# Patient Record
Sex: MALE | Race: WHITE | NOT HISPANIC OR LATINO | URBAN - METROPOLITAN AREA
[De-identification: names, ages, dates, MRNs, and addresses within clinical notes are randomized per-mention and may not be internally consistent; named-entity substitution may affect disease eponyms.]

---

## 2019-08-13 ENCOUNTER — OUTPATIENT (OUTPATIENT)
Dept: OUTPATIENT SERVICES | Facility: HOSPITAL | Age: 29
LOS: 1 days | Discharge: HOME | End: 2019-08-13

## 2019-08-13 DIAGNOSIS — R19.7 DIARRHEA, UNSPECIFIED: ICD-10-CM

## 2019-09-06 ENCOUNTER — INPATIENT (INPATIENT)
Facility: HOSPITAL | Age: 29
LOS: 3 days | Discharge: HOME | End: 2019-09-10
Attending: PSYCHIATRY & NEUROLOGY | Admitting: PSYCHIATRY & NEUROLOGY
Payer: MEDICAID

## 2019-09-06 VITALS — WEIGHT: 255.07 LBS

## 2019-09-06 DIAGNOSIS — F39 UNSPECIFIED MOOD [AFFECTIVE] DISORDER: ICD-10-CM

## 2019-09-06 LAB
APAP SERPL-MCNC: <5 UG/ML — LOW (ref 10–30)
BASOPHILS # BLD AUTO: 0.08 K/UL — SIGNIFICANT CHANGE UP (ref 0–0.2)
BASOPHILS NFR BLD AUTO: 0.8 % — SIGNIFICANT CHANGE UP (ref 0–1)
EOSINOPHIL # BLD AUTO: 0.17 K/UL — SIGNIFICANT CHANGE UP (ref 0–0.7)
EOSINOPHIL NFR BLD AUTO: 1.6 % — SIGNIFICANT CHANGE UP (ref 0–8)
ETHANOL SERPL-MCNC: <10 MG/DL — SIGNIFICANT CHANGE UP
HCT VFR BLD CALC: 47.5 % — SIGNIFICANT CHANGE UP (ref 42–52)
HGB BLD-MCNC: 16.5 G/DL — SIGNIFICANT CHANGE UP (ref 14–18)
IMM GRANULOCYTES NFR BLD AUTO: 0.6 % — HIGH (ref 0.1–0.3)
LYMPHOCYTES # BLD AUTO: 2.86 K/UL — SIGNIFICANT CHANGE UP (ref 1.2–3.4)
LYMPHOCYTES # BLD AUTO: 27.5 % — SIGNIFICANT CHANGE UP (ref 20.5–51.1)
MCHC RBC-ENTMCNC: 30 PG — SIGNIFICANT CHANGE UP (ref 27–31)
MCHC RBC-ENTMCNC: 34.7 G/DL — SIGNIFICANT CHANGE UP (ref 32–37)
MCV RBC AUTO: 86.4 FL — SIGNIFICANT CHANGE UP (ref 80–94)
MONOCYTES # BLD AUTO: 0.87 K/UL — HIGH (ref 0.1–0.6)
MONOCYTES NFR BLD AUTO: 8.4 % — SIGNIFICANT CHANGE UP (ref 1.7–9.3)
NEUTROPHILS # BLD AUTO: 6.35 K/UL — SIGNIFICANT CHANGE UP (ref 1.4–6.5)
NEUTROPHILS NFR BLD AUTO: 61.1 % — SIGNIFICANT CHANGE UP (ref 42.2–75.2)
NRBC # BLD: 0 /100 WBCS — SIGNIFICANT CHANGE UP (ref 0–0)
PLATELET # BLD AUTO: 183 K/UL — SIGNIFICANT CHANGE UP (ref 130–400)
RBC # BLD: 5.5 M/UL — SIGNIFICANT CHANGE UP (ref 4.7–6.1)
RBC # FLD: 12.5 % — SIGNIFICANT CHANGE UP (ref 11.5–14.5)
SALICYLATES SERPL-MCNC: <0.3 MG/DL — LOW (ref 4–30)
WBC # BLD: 10.39 K/UL — SIGNIFICANT CHANGE UP (ref 4.8–10.8)
WBC # FLD AUTO: 10.39 K/UL — SIGNIFICANT CHANGE UP (ref 4.8–10.8)

## 2019-09-06 PROCEDURE — 99285 EMERGENCY DEPT VISIT HI MDM: CPT

## 2019-09-06 RX ORDER — HYDROXYZINE HCL 10 MG
50 TABLET ORAL EVERY 6 HOURS
Refills: 0 | Status: DISCONTINUED | OUTPATIENT
Start: 2019-09-06 | End: 2019-09-10

## 2019-09-06 RX ORDER — RISPERIDONE 4 MG/1
0.5 TABLET ORAL
Refills: 0 | Status: DISCONTINUED | OUTPATIENT
Start: 2019-09-06 | End: 2019-09-10

## 2019-09-06 RX ORDER — ACETAMINOPHEN 500 MG
975 TABLET ORAL ONCE
Refills: 0 | Status: COMPLETED | OUTPATIENT
Start: 2019-09-06 | End: 2019-09-06

## 2019-09-06 RX ORDER — NICOTINE POLACRILEX 2 MG
1 GUM BUCCAL DAILY
Refills: 0 | Status: DISCONTINUED | OUTPATIENT
Start: 2019-09-06 | End: 2019-09-10

## 2019-09-06 RX ADMIN — Medication 975 MILLIGRAM(S): at 20:21

## 2019-09-06 RX ADMIN — RISPERIDONE 0.5 MILLIGRAM(S): 4 TABLET ORAL at 22:45

## 2019-09-06 RX ADMIN — Medication 975 MILLIGRAM(S): at 22:46

## 2019-09-06 RX ADMIN — Medication 1 PATCH: at 19:31

## 2019-09-06 NOTE — H&P ADULT - NSHPLABSRESULTS_GEN_ALL_CORE
16.5   10.39 )-----------( 183      ( 06 Sep 2019 19:10 )             47.5       09-06    142  |  106  |  12  ----------------------------<  100<H>  3.8   |  22  |  0.8    Ca    9.3      06 Sep 2019 19:10    TPro  7.2  /  Alb  4.7  /  TBili  <0.2  /  DBili  x   /  AST  27  /  ALT  43<H>  /  AlkPhos  66  09-06                      Lactate Trend            CAPILLARY BLOOD GLUCOSE

## 2019-09-06 NOTE — ED BEHAVIORAL HEALTH ASSESSMENT NOTE - RISK ASSESSMENT
Pt is at elevated risk due to labile mood, impulsivity, aggressive behavior and non engagement in treatment. Risk is mitigated by pt denying SI/HI, supportive family, and willingness to seek treatment.

## 2019-09-06 NOTE — H&P ADULT - HISTORY OF PRESENT ILLNESS
27 yo male, no pmh, presents to ed for psych evaluation . pt states is not on any medications, had questionable dx of bipolar as child, now sent in from outpt psych for admission/tx for aggression at home. At this time pt states he does not feel angry or agitated. denies fever, chills, cp, sob, si/hi, abd pain, nvd,.

## 2019-09-06 NOTE — ED BEHAVIORAL HEALTH ASSESSMENT NOTE - PAST PSYCHOTROPIC MEDICATION
Xanax 0.5mg q24 and Adderall 10mg q24 last 1 week ago  pt and wife do not recall names of other psychotropics
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
## 2019-09-06 NOTE — ED PROVIDER NOTE - PHYSICAL EXAMINATION
Physical Exam    Vital Signs: I have reviewed the initial vital signs.  Constitutional: well-nourished, appears stated age, no acute distress  Eyes: Conjunctiva pink, Sclera clear, PERRLA, EOMI.    Cardiovascular: S1 and S2, regular rate, regular rhythm, well-perfused extremities, radial pulses equal and 2+  Respiratory: unlabored respiratory effort, clear to auscultation bilaterally no wheezing, rales and rhonchi  Gastrointestinal: soft, non-tender abdomen, no pulsatile mass, normal bowl sounds  Musculoskeletal: supple neck, no lower extremity edema, no midline tenderness  Integumentary: warm, dry, no rash  Neurologic: awake, alert, cranial nerves II-XII grossly intact, extremities’ motor and sensory functions grossly intact  Psychiatric: appropriate mood, appropriate affect

## 2019-09-06 NOTE — ED PROVIDER NOTE - OBJECTIVE STATEMENT
27 yo male, no pmh, presents to ed for psych. pt states is not on any medications, had questionable dx of bipolar as child, 29 yo male, no pmh, presents to ed for psych. pt states is not on any medications, had questionable dx of bipolar as child, now sent in from outpt psych for admission/tx for aggression at home. At this time pt states he does not feel angry or agitated. denies fever, chills, cp, sob, si/hi, abd pain, nvd,.

## 2019-09-06 NOTE — ED BEHAVIORAL HEALTH ASSESSMENT NOTE - OTHER PAST PSYCHIATRIC HISTORY (INCLUDE DETAILS REGARDING ONSET, COURSE OF ILLNESS, INPATIENT/OUTPATIENT TREATMENT)
Pt reports that he was diagnosed with Bipolar disorder, which he and his wife think is questionable, and ADHD at age 12, but was poorly adherent with meds and followup. No psych followup or meds for past year. Pt reports that he was getting Xanax 0.5mg and Adderall 10mg from his PMD (last taken 1 week ago), but "they did not help".   Started seeing therapist Jose Maria Hannah 4 weeks ago who told pt he may have Borderline personality disorder    [facility] - lorazepam 1mg x 30 rx 6/20 by Dr Quinn

## 2019-09-06 NOTE — ED PROVIDER NOTE - ATTENDING CONTRIBUTION TO CARE
27 yo M presents with c/o needing to speak to psychiatrist.  Pt was evaluated as outpatient and they suggested that he should come to ED for possible admission.  Pt has been aggressive at home,  not suicidal, no AV hallucinations,.  On exam pt in NAD AAO x 3, steady gait, speech is clear, no sign sof trauma, Lungs cta B/L no wrr

## 2019-09-06 NOTE — ED PROVIDER NOTE - NS ED ROS FT
Constitutional: (-) fever, (-) chills  Eyes: (-) visual changes  ENT:  (-) nasal congestions  Cardiovascular: (-) chest pain, (-) syncope  Respiratory: (-) cough, (-) shortness of breath  Gastrointestinal: (-) vomiting, (-) diarrhea, (-)nausea,  Musculoskeletal: (-) neck pain, (-) back pain, (-) joint pain,  Integumentary: (-) rash,   Neurological: (-) headache, , (-) dizziness, (-) tingling, (-)numbness  Psychiatric: (-) hallucinations, (-)nervousness, (-)depression, (-)SI/HI  Peripheral Vascular: (-) pain while walking, (-) leg swelling  :  (-)dysuria  Allergic/Immunologic: (-) pruritus

## 2019-09-06 NOTE — H&P ADULT - NSHPPHYSICALEXAM_GEN_ALL_CORE
Vital Signs Last 24 Hrs  T(C): 36 (09-06-19 @ 21:30)  T(F): 96.8 (09-06-19 @ 21:30), Max: 97.8 (09-06-19 @ 17:32)  HR: 83 (09-06-19 @ 21:30) (83 - 105)  BP: 152/87 (09-06-19 @ 20:23)  BP(mean): --  RR: 16 (09-06-19 @ 21:30) (16 - 18)  SpO2: 97% (09-06-19 @ 20:23) (97% - 97%)  Wt(kg): --
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
## 2019-09-06 NOTE — ED BEHAVIORAL HEALTH ASSESSMENT NOTE - SUICIDE PROTECTIVE FACTORS
Responsibility to family and others/Identifies reasons for living/Positive therapeutic relationships/Future oriented/Supportive social network or family/Engaged in work or school

## 2019-09-06 NOTE — ED BEHAVIORAL HEALTH ASSESSMENT NOTE - DESCRIPTION
Initially cooperative with interview, but became irritable over the course. No verbal or physical aggression in ED none , domiciled with wife, has 2 yo daughter, employed

## 2019-09-07 LAB
ESTIMATED AVERAGE GLUCOSE: 120 MG/DL — HIGH (ref 68–114)
HAV IGM SER-ACNC: SIGNIFICANT CHANGE UP
HBA1C BLD-MCNC: 5.8 % — HIGH (ref 4–5.6)
HBV CORE IGM SER-ACNC: SIGNIFICANT CHANGE UP
HBV SURFACE AG SER-ACNC: SIGNIFICANT CHANGE UP
HCV AB S/CO SERPL IA: 0.15 S/CO — SIGNIFICANT CHANGE UP (ref 0–0.99)
HCV AB SERPL-IMP: SIGNIFICANT CHANGE UP
T PALLIDUM AB TITR SER: NEGATIVE — SIGNIFICANT CHANGE UP

## 2019-09-07 PROCEDURE — 99231 SBSQ HOSP IP/OBS SF/LOW 25: CPT

## 2019-09-07 RX ADMIN — Medication 1 PATCH: at 08:13

## 2019-09-07 RX ADMIN — RISPERIDONE 0.5 MILLIGRAM(S): 4 TABLET ORAL at 20:39

## 2019-09-07 RX ADMIN — Medication 1 PATCH: at 07:00

## 2019-09-07 RX ADMIN — Medication 1 PATCH: at 20:42

## 2019-09-07 RX ADMIN — RISPERIDONE 0.5 MILLIGRAM(S): 4 TABLET ORAL at 08:14

## 2019-09-07 RX ADMIN — Medication 50 MILLIGRAM(S): at 20:40
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
## 2019-09-08 PROCEDURE — 99231 SBSQ HOSP IP/OBS SF/LOW 25: CPT

## 2019-09-08 RX ORDER — ACETAMINOPHEN 500 MG
650 TABLET ORAL EVERY 6 HOURS
Refills: 0 | Status: DISCONTINUED | OUTPATIENT
Start: 2019-09-08 | End: 2019-09-10

## 2019-09-08 RX ORDER — ACETAMINOPHEN 500 MG
650 TABLET ORAL EVERY 6 HOURS
Refills: 0 | Status: DISCONTINUED | OUTPATIENT
Start: 2019-09-08 | End: 2019-09-08

## 2019-09-08 RX ADMIN — RISPERIDONE 0.5 MILLIGRAM(S): 4 TABLET ORAL at 20:01

## 2019-09-08 RX ADMIN — Medication 50 MILLIGRAM(S): at 20:01

## 2019-09-08 RX ADMIN — Medication 1 PATCH: at 20:02

## 2019-09-08 RX ADMIN — Medication 1 PATCH: at 08:03

## 2019-09-08 RX ADMIN — RISPERIDONE 0.5 MILLIGRAM(S): 4 TABLET ORAL at 08:04

## 2019-09-08 NOTE — PROGRESS NOTE BEHAVIORAL HEALTH - NSBHCHARTREVIEWVS_PSY_A_CORE FT
ICU Vital Signs Last 24 Hrs  T(C): 35.9 (08 Sep 2019 06:17), Max: 36.8 (07 Sep 2019 10:24)  T(F): 96.7 (08 Sep 2019 06:17), Max: 98.2 (07 Sep 2019 10:24)  HR: 68 (08 Sep 2019 06:17) (68 - 86)  BP: 144/83 (08 Sep 2019 06:17) (112/66 - 144/83)  BP(mean): --  ABP: --  ABP(mean): --  RR: 18 (08 Sep 2019 06:17) (16 - 18)  SpO2: --

## 2019-09-09 DIAGNOSIS — F29 UNSPECIFIED PSYCHOSIS NOT DUE TO A SUBSTANCE OR KNOWN PHYSIOLOGICAL CONDITION: ICD-10-CM

## 2019-09-09 LAB
AMPHET UR-MCNC: NEGATIVE — SIGNIFICANT CHANGE UP
APPEARANCE UR: CLEAR — SIGNIFICANT CHANGE UP
BARBITURATES UR SCN-MCNC: NEGATIVE — SIGNIFICANT CHANGE UP
BENZODIAZ UR-MCNC: NEGATIVE — SIGNIFICANT CHANGE UP
BILIRUB UR-MCNC: NEGATIVE — SIGNIFICANT CHANGE UP
COCAINE METAB.OTHER UR-MCNC: NEGATIVE — SIGNIFICANT CHANGE UP
COLOR SPEC: YELLOW — SIGNIFICANT CHANGE UP
DIFF PNL FLD: NEGATIVE — SIGNIFICANT CHANGE UP
GLUCOSE UR QL: NEGATIVE MG/DL — SIGNIFICANT CHANGE UP
KETONES UR-MCNC: NEGATIVE — SIGNIFICANT CHANGE UP
LEUKOCYTE ESTERASE UR-ACNC: NEGATIVE — SIGNIFICANT CHANGE UP
METHADONE UR-MCNC: NEGATIVE — SIGNIFICANT CHANGE UP
NITRITE UR-MCNC: NEGATIVE — SIGNIFICANT CHANGE UP
OPIATES UR-MCNC: NEGATIVE — SIGNIFICANT CHANGE UP
PCP SPEC-MCNC: SIGNIFICANT CHANGE UP
PH UR: 8.5 — SIGNIFICANT CHANGE UP (ref 5–8)
PROPOXYPHENE QUALITATIVE URINE RESULT: NEGATIVE — SIGNIFICANT CHANGE UP
PROT UR-MCNC: NEGATIVE MG/DL — SIGNIFICANT CHANGE UP
SP GR SPEC: 1.02 — SIGNIFICANT CHANGE UP (ref 1.01–1.03)
UROBILINOGEN FLD QL: 0.2 MG/DL — SIGNIFICANT CHANGE UP (ref 0.2–0.2)

## 2019-09-09 RX ADMIN — RISPERIDONE 0.5 MILLIGRAM(S): 4 TABLET ORAL at 08:12

## 2019-09-09 RX ADMIN — Medication 1 PATCH: at 18:19

## 2019-09-09 RX ADMIN — Medication 50 MILLIGRAM(S): at 20:21

## 2019-09-09 RX ADMIN — RISPERIDONE 0.5 MILLIGRAM(S): 4 TABLET ORAL at 20:20

## 2019-09-09 NOTE — PROGRESS NOTE BEHAVIORAL HEALTH - NSBHCHARTREVIEWLAB_PSY_A_CORE FT
06 Sep 2019 19:10  Sodium 142 [135 - 146]  Chloride 106 [98 - 110]  BUN 12 [10 - 20]  Glucose 100<H> [70 - 99]  Potassium 3.8 [3.5 - 5.0]  Anion Gap 14 [7 - 14]  Carbon dioxide 22 [17 - 32]  Creatinine 0.8 [0.7 - 1.5]      Ca    9.3 [8.5 - 10.1]      06 Sep 2019 19:10        06 Sep 2019 19:10  TPro 7.2 [6.0 - 8.0]  Alb  4.7 [3.5 - 5.2]   TBili <0.2 [0.2 - 1.2]   DBili x       AST  27 [0 - 41]  ALT  43<H> [0 - 41]   AlkPhos 66 [30 - 115]

## 2019-09-09 NOTE — PROGRESS NOTE BEHAVIORAL HEALTH - NSBHCHARTREVIEWVS_PSY_A_CORE FT
T(C): 36.2 (09-09-19 @ 10:00), Max: 36.2 (09-09-19 @ 10:00)  HR: 83 (09-09-19 @ 10:00) (64 - 83)  BP: 159/97 (09-09-19 @ 10:00) (136/87 - 159/97)  RR: 20 (09-09-19 @ 10:00) (18 - 20)  SpO2: --

## 2019-09-09 NOTE — PROGRESS NOTE BEHAVIORAL HEALTH - NSBHFUPINTERVALHXFT_PSY_A_CORE
Chart reviewed. discuss with nursing. spoke with patient. this is a 29 yo  male, , domiciled, employed, with reported psych hx of Bipolar disorder and ADHD, no past SA/IPP, no current meds, sent to ED by therapist for progressively worsening mood and aggressive behavior.   pt has been in behavioral control and has not needed any prn as per nursing, He slept ok. He denies s/h ideations/ his mood is anxious and his affect is mood congruent at this time. he is compliant with current medications.
Pt was seen, and evaluated, and chart was reviewed. No acute events overnight.  Patient is alert, Ox3,  calm, cooperative, compliant with treatment.     ***Pt denies and presents no evidence for suicidal or homicidal ideas plans or intentions.  Pt is not acutely psychotic and denies A/V H.  ***Pt slept well, and reports normal appetite and regular bowel movements. Pt is tolerating meds well w/o any acute S/E.  Pt has no medication related complaints. Continues current medication regimen. With patient's consent writer spoke with pt's wife Simin  by phone ([phone] )   and obtained additional background information.  Pt's wife reports that she has visited her  and feels he is much better and she is willing for him to come home . She reports that pt had AH over th past few months hearing his baby cry when he was not or or hearing his wife talk when she was not .  Wife aslfaizan reports that over the past few months pt was at times "irritable., irrate, yeling cursing, threatening, and was p[hysically aggressive". [facility] pt had an rx of Ativan 1mg po #30, but no xanx or adderall. Pt is in good behavioral control. Pt's discharge is  projected  for  tomorrow with psychiatric f/u tx at  [facility].
this is a 29 yo  male, , domiciled, employed, with reported psych hx of Bipolar disorder and ADHD, no past SA/IPP, no current meds, sent to ED by therapist for progressively worsening mood and aggressive behavior.   Pt was seen, evaluated, chart reviewed.  As per nursing staff, no events overnight. No behavioral outbursts. No PRNs given. On evaluation, pt reports that he is doing "ok."   Endorsed fair sleep and appetite. States that he was "started on some medication" and denied side effects. Pt denied A/V hallucinations. Denied suicidal/homicidal ideation, intent or plan.

## 2019-09-09 NOTE — CHART NOTE - NSCHARTNOTEFT_GEN_A_CORE
Patient is a 29 year old male  domiciled and employed with a history of mood disorder admitted to unit 939 emergency status for treatment safety and observation. Patient has been irritable as of late, arguing with his wife acting impulsively and with potential for unpredictable behavior. Patient reports being in treatment for bipolar disorder in the past but never felt much better. 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 Patient denies any illicit drug use. Patient denies excessive alcohol use. Patient  denies SI HI and AVH. Patient amenable to treatment. Patient admitted to unit for treatment safety and observation. Please see social work psychosocial assessment for more information.

## 2019-09-09 NOTE — CONSULT NOTE ADULT - SUBJECTIVE AND OBJECTIVE BOX
BEBETO MEYERS  29y  Male      Patient is a 29y old  Male who presents with a chief complaint of 28 YEARS OLD MALE COME TO ER FOR PSYCHIATRY EVALUATION . (06 Sep 2019 21:27)    HPI:  27 yo male, no pmh, presents to ed for psych evaluation . pt states is not on any medications, had questionable dx of bipolar as child, now sent in from outpt psych for admission/tx for aggression at home. At this time pt states he does not feel angry or agitated. denies fever, chills, cp, sob, si/hi, abd pain, nvd,. (06 Sep 2019 21:27)    INTERVAL HPI/OVERNIGHT EVENTS:  HEALTH ISSUES - PROBLEM Dx:  Mood disorder        PAST MEDICAL & SURGICAL HISTORY:  No pertinent past medical history  No significant past surgical history    FAMILY HISTORY:    acetaminophen   Tablet .. 650 milliGRAM(s) Oral every 6 hours PRN  hydrOXYzine hydrochloride 50 milliGRAM(s) Oral every 6 hours PRN  nicotine - 21 mG/24Hr(s) Patch 1 Patch Transdermal daily  risperiDONE   Tablet 0.5 milliGRAM(s) Oral two times a day      REVIEW OF SYSTEMS:  CONSTITUTIONAL: No fever, weight loss, or fatigue  EYES: No eye pain, visual disturbances, or discharge  ENMT:  No difficulty hearing, tinnitus, vertigo; No sinus or throat pain  NECK: No pain or stiffness  BREASTS: No pain, masses, or nipple discharge  RESPIRATORY: No cough, wheezing, chills or hemoptysis; No shortness of breath  CARDIOVASCULAR: No chest pain, palpitations, dizziness, or leg swelling  GASTROINTESTINAL: No abdominal or epigastric pain. No nausea, vomiting, or hematemesis; No diarrhea or constipation. No melena or hematochezia.  GENITOURINARY: No dysuria, frequency, hematuria, or incontinence  NEUROLOGICAL: No headaches, memory loss, loss of strength, numbness, or tremors  SKIN: No itching, burning, rashes, or lesions   LYMPH NODES: No enlarged glands  ENDOCRINE: No heat or cold intolerance; No hair loss  MUSCULOSKELETAL: No joint pain or swelling; No muscle, back, or extremity pain  PSYCHIATRIC: as per hpi and previous psych history  HEME/LYMPH: No easy bruising, or bleeding gums  ALLERY AND IMMUNOLOGIC: No hives or eczema    T(C): 36 (09-09-19 @ 06:12), Max: 36.2 (09-08-19 @ 09:27)  HR: 64 (09-09-19 @ 06:12) (64 - 88)  BP: 136/87 (09-09-19 @ 06:12) (136/87 - 145/75)  RR: 18 (09-09-19 @ 06:12) (18 - 18)  SpO2: --  Wt(kg): --Vital Signs Last 24 Hrs  T(C): 36 (09 Sep 2019 06:12), Max: 36.2 (08 Sep 2019 09:27)  T(F): 96.8 (09 Sep 2019 06:12), Max: 97.1 (08 Sep 2019 09:27)  HR: 64 (09 Sep 2019 06:12) (64 - 88)  BP: 136/87 (09 Sep 2019 06:12) (136/87 - 145/75)  BP(mean): --  RR: 18 (09 Sep 2019 06:12) (18 - 18)  SpO2: --    PHYSICAL EXAM:  GENERAL: NAD,well-developed  HEAD:  Atraumatic, Normocephalic  EYES: EOMI, PERRLA, conjunctiva and sclera clear  ENMT: No tonsillar erythema, exudates, or enlargement; Moist mucous membranes, Good dentition, No lesions  NECK: Supple, No JVD, Normal thyroid  CHEST/LUNG: Clear bs bilaterally; No rales, rhonchi, wheezing  HEART: Regular rate and rhythm; No murmurs, rubs, or gallops  ABDOMEN: Soft, Nontender, Nondistended; Bowel sounds present  EXTREMITIES:  2+ Peripheral Pulses, No clubbing, cyanosis, or edema  LYMPH: No lymphadenopathy noted  SKIN: No rashes or lesions  Neuro: alert  no focal deficits    Consultant(s) Notes Reviewed:  [x ] YES  [ ] NO  Care Discussed with Consultants/Other Providers [ x] YES  [ ] NO    LABS:              CAPILLARY BLOOD GLUCOSE                RADIOLOGY & ADDITIONAL TESTS:    Imaging Personally Reviewed:  [ ] YES  [ ] NO

## 2019-09-09 NOTE — PROGRESS NOTE BEHAVIORAL HEALTH - NSBHADMITIPREASON_PSY_A_CORE
aggressive behavior/Danger to others; mental illness expected to respond to inpatient care

## 2019-09-10 VITALS
DIASTOLIC BLOOD PRESSURE: 96 MMHG | TEMPERATURE: 97 F | HEART RATE: 95 BPM | RESPIRATION RATE: 20 BRPM | SYSTOLIC BLOOD PRESSURE: 149 MMHG

## 2019-09-10 DIAGNOSIS — I10 ESSENTIAL (PRIMARY) HYPERTENSION: ICD-10-CM

## 2019-09-10 RX ORDER — RISPERIDONE 4 MG/1
1 TABLET ORAL
Qty: 60 | Refills: 0
Start: 2019-09-10 | End: 2019-10-09

## 2019-09-10 RX ORDER — HYDROXYZINE HCL 10 MG
1 TABLET ORAL
Qty: 40 | Refills: 0
Start: 2019-09-10 | End: 2019-10-09

## 2019-09-10 RX ORDER — ACETAMINOPHEN 500 MG
2 TABLET ORAL
Qty: 0 | Refills: 0 | DISCHARGE
Start: 2019-09-10

## 2019-09-10 RX ORDER — NICOTINE POLACRILEX 2 MG
1 GUM BUCCAL
Qty: 30 | Refills: 0
Start: 2019-09-10 | End: 2019-10-09

## 2019-09-10 RX ORDER — ACETAMINOPHEN 500 MG
650 TABLET ORAL EVERY 6 HOURS
Refills: 0 | Status: DISCONTINUED | OUTPATIENT
Start: 2019-09-10 | End: 2019-09-10

## 2019-09-10 RX ADMIN — Medication 650 MILLIGRAM(S): at 01:47

## 2019-09-10 RX ADMIN — Medication 1 PATCH: at 05:47

## 2019-09-10 RX ADMIN — Medication 650 MILLIGRAM(S): at 01:17

## 2019-09-10 RX ADMIN — RISPERIDONE 0.5 MILLIGRAM(S): 4 TABLET ORAL at 08:11

## 2019-09-10 RX ADMIN — Medication 1 PATCH: at 08:12

## 2019-09-10 NOTE — PROGRESS NOTE ADULT - PROBLEM SELECTOR PLAN 1
continue present treatment as per psych plan as reviewed  Medically stable with no new changes in treatment  will continue to monitor medical status while being treated on psych  medically stable for d/c  need outpt f/u
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
## 2019-09-10 NOTE — DISCHARGE NOTE BEHAVIORAL HEALTH - PAST PSYCHIATRIC HISTORY
Pt reports that he was diagnosed with Bipolar disorder, which he and his wife think is questionable, and ADHD at age 12, but was poorly adherent with meds and followup. No psych followup or meds for past year. Pt reports that he was getting Xanax 0.5mg and Adderall 10mg from his PMD (last taken 1 week ago), but "they did not help".   Started seeing therapist Jose Maria Hannah 4 weeks ago who told pt he may have Borderline personality disorder

## 2019-09-10 NOTE — DISCHARGE NOTE BEHAVIORAL HEALTH - NSBHDCMEDICALFT_PSY_A_CORE
pt is obese and lipids and FBG are slightly elevated- pt was advised to address these issues with his PCP

## 2019-09-10 NOTE — DISCHARGE NOTE BEHAVIORAL HEALTH - NSBHDCCONDITIONFT_PSY_A_CORE
Patient's condition improved. Upon discharge the patient is in good behavioral control, and presents no acute management problems. Patient denies and presents no evidence for suicidal or homicidal ideas plans or intentions. Patient is not acutely depressed, manic or psychotic. Patient has been sleeping  well, and reports normal appetite and regular bowel movements. Pt was not aggressive or hostile to anyone over th past two days. Pt and his wife feel comfortable with the discharge plan and the arrangements for f/u tx.

## 2019-09-10 NOTE — DISCHARGE NOTE BEHAVIORAL HEALTH - NSBHDCDXVALIDYESFT_PSY_A_CORE
mood d/o NOS; psychosis NOS; bipolar d/o; r/o Adderall misuse  Pt responded well to tx and his condition rapidly stabilized. Pt is advised to continue psychiatric follow up at the [facility] for further diagnostic  evaluation and treatment

## 2019-09-10 NOTE — PROGRESS NOTE ADULT - SUBJECTIVE AND OBJECTIVE BOX
pt stable alert in NAD  no new complaints    MOOD DISORDER  ^PSYCH EVAL  Handoff  MEWS Score  No pertinent past medical history  Mood disorder  Psychosis, unspecified psychosis type  Mood disorder  No significant past surgical history  PSYCH EVAL    HEALTH ISSUES - PROBLEM Dx:  Psychosis, unspecified psychosis type  Mood disorder        PAST MEDICAL & SURGICAL HISTORY:  No pertinent past medical history  No significant past surgical history    No Known Allergies      FAMILY HISTORY:      acetaminophen   Tablet .. 650 milliGRAM(s) Oral every 6 hours PRN  acetaminophen   Tablet .. 650 milliGRAM(s) Oral every 6 hours PRN  hydrOXYzine hydrochloride 50 milliGRAM(s) Oral every 6 hours PRN  nicotine - 21 mG/24Hr(s) Patch 1 Patch Transdermal daily  risperiDONE   Tablet 0.5 milliGRAM(s) Oral two times a day      T(C): 36.2 (09-10-19 @ 08:20), Max: 36.6 (09-09-19 @ 18:12)  HR: 95 (09-10-19 @ 08:20) (83 - 105)  BP: 149/96 (09-10-19 @ 08:20) (123/64 - 159/97)  RR: 20 (09-10-19 @ 08:20) (18 - 20)  SpO2: --    PE;  general:  no cahnges noted in nad    Lungs:    Heart:    EXT:    Neuro:  alert no defcits                          CAPILLARY BLOOD GLUCOSE

## 2019-09-10 NOTE — DISCHARGE NOTE BEHAVIORAL HEALTH - SECONDARY DIAGNOSIS.
Psychosis, unspecified psychosis type Metabolic syndrome Morbid obesity with BMI of 40.0-44.9, adult

## 2019-09-10 NOTE — DISCHARGE NOTE BEHAVIORAL HEALTH - NSTOBACCOREFERRAL_GEN_A_NCS
Patient  registered and referred to the [place] [facility]. Phone appointment scheduled for 9/12/19 at 0900./Yes

## 2019-09-10 NOTE — DISCHARGE NOTE BEHAVIORAL HEALTH - FAMILY HISTORY OF PSYCHIATRIC ILLNESS
28 yo  male,  (x 4yrs) , father of two ( 2y/o and 4 months old),  domiciled, HS graduate, employed as . No known legal  issues. .

## 2019-09-10 NOTE — DISCHARGE NOTE BEHAVIORAL HEALTH - MEDICATION SUMMARY - MEDICATIONS TO TAKE
I will START or STAY ON the medications listed below when I get home from the hospital:    risperiDONE 0.5 mg oral tablet  -- 1 tab(s) by mouth 2 times a day x 30 days   -- Indication: For MOOt instability/ psychosis    hydrOXYzine hydrochloride 50 mg oral tablet  -- 1 tab(s) by mouth every 6 hours x 30 days, As Needed -anxiety/ insomnia/ Agitation   -- Indication: For anxiety/ insomnia    nicotine 21 mg/24 hr transdermal film, extended release  -- 1 patch by transdermal patch once a day x 30 days   -- Indication: For NRT

## 2019-09-10 NOTE — DISCHARGE NOTE BEHAVIORAL HEALTH - CARE PROVIDER_API CALL
Himanshu Hernandez)  Psychiatry  [address] [place] [address]  Phone: [phone]  Fax: [phone]  Follow Up Time:

## 2019-09-10 NOTE — CHART NOTE - NSCHARTNOTEFT_GEN_A_CORE
Pt discharging today as per attending. Pt AOx3 denies SI Hi and AVH. Patient eager to discharge and expresses a commitment to the follow up plan. Please see below for detailed social work discharge plan.    SOCIAL WORK:    SOCIAL WORK DISCHARGE PLAN - Residence:  · Residence	home      Aftercare Appointments:  · Agency Name	[facility]  · Appointment Date/Time	17-Sep-2019 12:30  · Address	[address] [place] [address]  · Phone #	[phone]  · Purpose	out patient follow up with therapist Catalina  · Agency Name	[facility]  · Appointment Date/Time	19-Sep-2019 09:30  · Address	[address] [facility] [address]  · Phone #	[phone]  · Purpose	out patient follow up with Dr. Rico     Alcohol/Substance Abuse Treatment and Referrals:  · Alcohol/Substance Use Referrals	no   · Other Referrals	no      Crisis Plan:  1. If I Have The Following Problems:: Suicidal Thoughts  I Should:: Report to the nearest emergency room     Resources:  · Inpatient Psychiatric Unit - 24/7 phone #	[phone]  · Does the patient have a ?	no      Advance Directive:  · Does patient have advance directives (both medical and psychiatric), or a surrogate decision maker?	no...   · Reason	pt declines     24 Hours Prior to Discharge:  · Caregiver Identified	yes...   · Caregiver Name and Contact Information	wife Simin   · Caregiver notified of discharge/transfer	yes   · Patient education provided	yes   · Caregiver education provided	yes   · Comments	faxed to [facility] x [phone] 9-10-19 2pm

## 2019-09-12 DIAGNOSIS — E88.81 METABOLIC SYNDROME AND OTHER INSULIN RESISTANCE: ICD-10-CM

## 2019-09-12 DIAGNOSIS — Z28.21 IMMUNIZATION NOT CARRIED OUT BECAUSE OF PATIENT REFUSAL: ICD-10-CM

## 2019-09-12 DIAGNOSIS — I10 ESSENTIAL (PRIMARY) HYPERTENSION: ICD-10-CM

## 2019-09-12 DIAGNOSIS — F17.200 NICOTINE DEPENDENCE, UNSPECIFIED, UNCOMPLICATED: ICD-10-CM

## 2019-09-12 DIAGNOSIS — F29 UNSPECIFIED PSYCHOSIS NOT DUE TO A SUBSTANCE OR KNOWN PHYSIOLOGICAL CONDITION: ICD-10-CM

## 2019-09-12 DIAGNOSIS — F31.9 BIPOLAR DISORDER, UNSPECIFIED: ICD-10-CM

## 2019-09-12 DIAGNOSIS — E66.01 MORBID (SEVERE) OBESITY DUE TO EXCESS CALORIES: ICD-10-CM

## 2019-09-12 DIAGNOSIS — F39 UNSPECIFIED MOOD [AFFECTIVE] DISORDER: ICD-10-CM

## 2019-09-17 ENCOUNTER — OUTPATIENT (OUTPATIENT)
Dept: OUTPATIENT SERVICES | Facility: HOSPITAL | Age: 29
LOS: 1 days | Discharge: HOME | End: 2019-09-17

## 2019-09-17 PROBLEM — Z78.9 OTHER SPECIFIED HEALTH STATUS: Chronic | Status: ACTIVE | Noted: 2019-09-06

## 2019-09-18 DIAGNOSIS — F41.9 ANXIETY DISORDER, UNSPECIFIED: ICD-10-CM

## 2019-09-19 ENCOUNTER — OUTPATIENT (OUTPATIENT)
Dept: OUTPATIENT SERVICES | Facility: HOSPITAL | Age: 29
LOS: 1 days | Discharge: HOME | End: 2019-09-19
Payer: MEDICAID

## 2019-09-19 DIAGNOSIS — F41.9 ANXIETY DISORDER, UNSPECIFIED: ICD-10-CM

## 2019-09-19 PROCEDURE — 99214 OFFICE O/P EST MOD 30 MIN: CPT | Mod: GC

## 2019-10-01 ENCOUNTER — OUTPATIENT (OUTPATIENT)
Dept: OUTPATIENT SERVICES | Facility: HOSPITAL | Age: 29
LOS: 1 days | Discharge: HOME | End: 2019-10-01

## 2019-10-01 DIAGNOSIS — F41.9 ANXIETY DISORDER, UNSPECIFIED: ICD-10-CM

## 2019-10-07 ENCOUNTER — OUTPATIENT (OUTPATIENT)
Dept: OUTPATIENT SERVICES | Facility: HOSPITAL | Age: 29
LOS: 1 days | Discharge: HOME | End: 2019-10-07

## 2019-10-07 DIAGNOSIS — F41.9 ANXIETY DISORDER, UNSPECIFIED: ICD-10-CM

## 2019-10-15 ENCOUNTER — OUTPATIENT (OUTPATIENT)
Dept: OUTPATIENT SERVICES | Facility: HOSPITAL | Age: 29
LOS: 1 days | Discharge: HOME | End: 2019-10-15
Payer: MEDICAID

## 2019-10-15 DIAGNOSIS — F41.9 ANXIETY DISORDER, UNSPECIFIED: ICD-10-CM

## 2019-10-15 PROCEDURE — 99213 OFFICE O/P EST LOW 20 MIN: CPT | Mod: GC

## 2019-10-21 ENCOUNTER — OUTPATIENT (OUTPATIENT)
Dept: OUTPATIENT SERVICES | Facility: HOSPITAL | Age: 29
LOS: 1 days | Discharge: HOME | End: 2019-10-21

## 2019-10-21 DIAGNOSIS — F41.9 ANXIETY DISORDER, UNSPECIFIED: ICD-10-CM

## 2019-11-11 ENCOUNTER — OUTPATIENT (OUTPATIENT)
Dept: OUTPATIENT SERVICES | Facility: HOSPITAL | Age: 29
LOS: 1 days | Discharge: HOME | End: 2019-11-11
Payer: MEDICAID

## 2019-11-11 DIAGNOSIS — F63.81 INTERMITTENT EXPLOSIVE DISORDER: ICD-10-CM

## 2019-11-11 PROCEDURE — 99214 OFFICE O/P EST MOD 30 MIN: CPT | Mod: GC

## 2019-12-10 ENCOUNTER — OUTPATIENT (OUTPATIENT)
Dept: OUTPATIENT SERVICES | Facility: HOSPITAL | Age: 29
LOS: 1 days | Discharge: HOME | End: 2019-12-10
Payer: MEDICAID

## 2019-12-10 DIAGNOSIS — M54.5 LOW BACK PAIN: ICD-10-CM

## 2019-12-10 PROCEDURE — 72110 X-RAY EXAM L-2 SPINE 4/>VWS: CPT | Mod: 26

## 2020-07-20 ENCOUNTER — INPATIENT (INPATIENT)
Facility: HOSPITAL | Age: 30
LOS: 0 days | Discharge: HOME | End: 2020-07-21
Attending: SURGERY | Admitting: SURGERY
Payer: MEDICAID

## 2020-07-20 ENCOUNTER — RESULT REVIEW (OUTPATIENT)
Age: 30
End: 2020-07-20

## 2020-07-20 VITALS
WEIGHT: 265 LBS | HEART RATE: 94 BPM | RESPIRATION RATE: 18 BRPM | DIASTOLIC BLOOD PRESSURE: 88 MMHG | SYSTOLIC BLOOD PRESSURE: 164 MMHG | TEMPERATURE: 99 F | OXYGEN SATURATION: 95 %

## 2020-07-20 LAB
ALBUMIN SERPL ELPH-MCNC: 4.4 G/DL — SIGNIFICANT CHANGE UP (ref 3.5–5.2)
ALP SERPL-CCNC: 66 U/L — SIGNIFICANT CHANGE UP (ref 30–115)
ALT FLD-CCNC: 26 U/L — SIGNIFICANT CHANGE UP (ref 0–41)
ANION GAP SERPL CALC-SCNC: 12 MMOL/L — SIGNIFICANT CHANGE UP (ref 7–14)
APPEARANCE UR: CLEAR — SIGNIFICANT CHANGE UP
AST SERPL-CCNC: 18 U/L — SIGNIFICANT CHANGE UP (ref 0–41)
BACTERIA # UR AUTO: NEGATIVE — SIGNIFICANT CHANGE UP
BASOPHILS # BLD AUTO: 0.03 K/UL — SIGNIFICANT CHANGE UP (ref 0–0.2)
BASOPHILS NFR BLD AUTO: 0.3 % — SIGNIFICANT CHANGE UP (ref 0–1)
BILIRUB DIRECT SERPL-MCNC: <0.2 MG/DL — SIGNIFICANT CHANGE UP (ref 0–0.2)
BILIRUB INDIRECT FLD-MCNC: >0 MG/DL — LOW (ref 0.2–1.2)
BILIRUB SERPL-MCNC: 0.2 MG/DL — SIGNIFICANT CHANGE UP (ref 0.2–1.2)
BILIRUB UR-MCNC: NEGATIVE — SIGNIFICANT CHANGE UP
BLD GP AB SCN SERPL QL: SIGNIFICANT CHANGE UP
BUN SERPL-MCNC: 13 MG/DL — SIGNIFICANT CHANGE UP (ref 10–20)
CALCIUM SERPL-MCNC: 9 MG/DL — SIGNIFICANT CHANGE UP (ref 8.5–10.1)
CHLORIDE SERPL-SCNC: 105 MMOL/L — SIGNIFICANT CHANGE UP (ref 98–110)
CO2 SERPL-SCNC: 24 MMOL/L — SIGNIFICANT CHANGE UP (ref 17–32)
COLOR SPEC: YELLOW — SIGNIFICANT CHANGE UP
CREAT SERPL-MCNC: 0.9 MG/DL — SIGNIFICANT CHANGE UP (ref 0.7–1.5)
DIFF PNL FLD: NEGATIVE — SIGNIFICANT CHANGE UP
EOSINOPHIL # BLD AUTO: 0.16 K/UL — SIGNIFICANT CHANGE UP (ref 0–0.7)
EOSINOPHIL NFR BLD AUTO: 1.9 % — SIGNIFICANT CHANGE UP (ref 0–8)
EPI CELLS # UR: 2 /HPF — SIGNIFICANT CHANGE UP (ref 0–5)
GLUCOSE SERPL-MCNC: 174 MG/DL — HIGH (ref 70–99)
GLUCOSE UR QL: NEGATIVE — SIGNIFICANT CHANGE UP
HCT VFR BLD CALC: 46.2 % — SIGNIFICANT CHANGE UP (ref 42–52)
HGB BLD-MCNC: 15.9 G/DL — SIGNIFICANT CHANGE UP (ref 14–18)
HYALINE CASTS # UR AUTO: 15 /LPF — HIGH (ref 0–7)
IMM GRANULOCYTES NFR BLD AUTO: 0.3 % — SIGNIFICANT CHANGE UP (ref 0.1–0.3)
INR BLD: 0.99 RATIO — SIGNIFICANT CHANGE UP (ref 0.65–1.3)
KETONES UR-MCNC: SIGNIFICANT CHANGE UP
LEUKOCYTE ESTERASE UR-ACNC: ABNORMAL
LIDOCAIN IGE QN: 27 U/L — SIGNIFICANT CHANGE UP (ref 7–60)
LYMPHOCYTES # BLD AUTO: 1.94 K/UL — SIGNIFICANT CHANGE UP (ref 1.2–3.4)
LYMPHOCYTES # BLD AUTO: 22.5 % — SIGNIFICANT CHANGE UP (ref 20.5–51.1)
MCHC RBC-ENTMCNC: 30.2 PG — SIGNIFICANT CHANGE UP (ref 27–31)
MCHC RBC-ENTMCNC: 34.4 G/DL — SIGNIFICANT CHANGE UP (ref 32–37)
MCV RBC AUTO: 87.7 FL — SIGNIFICANT CHANGE UP (ref 80–94)
MONOCYTES # BLD AUTO: 0.52 K/UL — SIGNIFICANT CHANGE UP (ref 0.1–0.6)
MONOCYTES NFR BLD AUTO: 6 % — SIGNIFICANT CHANGE UP (ref 1.7–9.3)
NEUTROPHILS # BLD AUTO: 5.96 K/UL — SIGNIFICANT CHANGE UP (ref 1.4–6.5)
NEUTROPHILS NFR BLD AUTO: 69 % — SIGNIFICANT CHANGE UP (ref 42.2–75.2)
NITRITE UR-MCNC: NEGATIVE — SIGNIFICANT CHANGE UP
NRBC # BLD: 0 /100 WBCS — SIGNIFICANT CHANGE UP (ref 0–0)
PH UR: 6 — SIGNIFICANT CHANGE UP (ref 5–8)
PLATELET # BLD AUTO: 152 K/UL — SIGNIFICANT CHANGE UP (ref 130–400)
POTASSIUM SERPL-MCNC: 3.6 MMOL/L — SIGNIFICANT CHANGE UP (ref 3.5–5)
POTASSIUM SERPL-SCNC: 3.6 MMOL/L — SIGNIFICANT CHANGE UP (ref 3.5–5)
PROT SERPL-MCNC: 6.4 G/DL — SIGNIFICANT CHANGE UP (ref 6–8)
PROT UR-MCNC: SIGNIFICANT CHANGE UP
PROTHROM AB SERPL-ACNC: 11.4 SEC — SIGNIFICANT CHANGE UP (ref 9.95–12.87)
RBC # BLD: 5.27 M/UL — SIGNIFICANT CHANGE UP (ref 4.7–6.1)
RBC # FLD: 12.4 % — SIGNIFICANT CHANGE UP (ref 11.5–14.5)
RBC CASTS # UR COMP ASSIST: 2 /HPF — SIGNIFICANT CHANGE UP (ref 0–4)
SODIUM SERPL-SCNC: 141 MMOL/L — SIGNIFICANT CHANGE UP (ref 135–146)
SP GR SPEC: 1.04 — HIGH (ref 1.01–1.02)
UROBILINOGEN FLD QL: ABNORMAL
WBC # BLD: 8.64 K/UL — SIGNIFICANT CHANGE UP (ref 4.8–10.8)
WBC # FLD AUTO: 8.64 K/UL — SIGNIFICANT CHANGE UP (ref 4.8–10.8)
WBC UR QL: 37 /HPF — HIGH (ref 0–5)

## 2020-07-20 PROCEDURE — 99285 EMERGENCY DEPT VISIT HI MDM: CPT

## 2020-07-20 PROCEDURE — 74177 CT ABD & PELVIS W/CONTRAST: CPT | Mod: 26

## 2020-07-20 PROCEDURE — 71046 X-RAY EXAM CHEST 2 VIEWS: CPT | Mod: 26

## 2020-07-20 PROCEDURE — 99223 1ST HOSP IP/OBS HIGH 75: CPT | Mod: 57

## 2020-07-20 PROCEDURE — 93010 ELECTROCARDIOGRAM REPORT: CPT

## 2020-07-20 RX ORDER — SODIUM CHLORIDE 9 MG/ML
1000 INJECTION INTRAMUSCULAR; INTRAVENOUS; SUBCUTANEOUS
Refills: 0 | Status: DISCONTINUED | OUTPATIENT
Start: 2020-07-20 | End: 2020-07-21

## 2020-07-20 RX ORDER — CEFOTETAN DISODIUM 1 G
2 VIAL (EA) INJECTION ONCE
Refills: 0 | Status: COMPLETED | OUTPATIENT
Start: 2020-07-20 | End: 2020-07-20

## 2020-07-20 RX ORDER — OXYCODONE HYDROCHLORIDE 5 MG/1
5 TABLET ORAL EVERY 6 HOURS
Refills: 0 | Status: DISCONTINUED | OUTPATIENT
Start: 2020-07-20 | End: 2020-07-21

## 2020-07-20 RX ORDER — AMPICILLIN SODIUM AND SULBACTAM SODIUM 250; 125 MG/ML; MG/ML
3 INJECTION, POWDER, FOR SUSPENSION INTRAMUSCULAR; INTRAVENOUS EVERY 6 HOURS
Refills: 0 | Status: DISCONTINUED | OUTPATIENT
Start: 2020-07-20 | End: 2020-07-20

## 2020-07-20 RX ORDER — ENOXAPARIN SODIUM 100 MG/ML
40 INJECTION SUBCUTANEOUS DAILY
Refills: 0 | Status: DISCONTINUED | OUTPATIENT
Start: 2020-07-20 | End: 2020-07-21

## 2020-07-20 RX ORDER — ACETAMINOPHEN 500 MG
650 TABLET ORAL EVERY 6 HOURS
Refills: 0 | Status: DISCONTINUED | OUTPATIENT
Start: 2020-07-20 | End: 2020-07-21

## 2020-07-20 RX ORDER — AMPICILLIN SODIUM AND SULBACTAM SODIUM 250; 125 MG/ML; MG/ML
3 INJECTION, POWDER, FOR SUSPENSION INTRAMUSCULAR; INTRAVENOUS ONCE
Refills: 0 | Status: COMPLETED | OUTPATIENT
Start: 2020-07-20 | End: 2020-07-20

## 2020-07-20 RX ORDER — IBUPROFEN 200 MG
600 TABLET ORAL EVERY 6 HOURS
Refills: 0 | Status: DISCONTINUED | OUTPATIENT
Start: 2020-07-20 | End: 2020-07-21

## 2020-07-20 RX ORDER — PANTOPRAZOLE SODIUM 20 MG/1
40 TABLET, DELAYED RELEASE ORAL
Refills: 0 | Status: DISCONTINUED | OUTPATIENT
Start: 2020-07-20 | End: 2020-07-21

## 2020-07-20 RX ADMIN — AMPICILLIN SODIUM AND SULBACTAM SODIUM 200 GRAM(S): 250; 125 INJECTION, POWDER, FOR SUSPENSION INTRAMUSCULAR; INTRAVENOUS at 21:40

## 2020-07-20 RX ADMIN — Medication 100 GRAM(S): at 20:36

## 2020-07-20 RX ADMIN — SODIUM CHLORIDE 100 MILLILITER(S): 9 INJECTION INTRAMUSCULAR; INTRAVENOUS; SUBCUTANEOUS at 21:39

## 2020-07-20 NOTE — ED PROVIDER NOTE - NS ED ROS FT
CONST: No fever, chills or bodyaches  EYES: No pain, redness, drainage or visual changes.  ENT: No ear pain or discharge, nasal discharge or congestion. No sore throat  CARD: No chest pain, palpitations  RESP: No SOB, cough  GI: see HPI  : No dysuria, hematuria.   MS: No joint pain, back pain or extremity pain/injury  SKIN: No rashes  NEURO: No headache, dizziness, paresthesias or LOC

## 2020-07-20 NOTE — H&P ADULT - ASSESSMENT
29 years old male c/o Abdominal pain for the last 2 days. States he has a progressive sharp abdominal pain, started in lower abdomen, and then the pain was more localized in the RLQ, no radiation, worsens with movement, relieved by nothing. Associated with nausea and vomiting. Denies fever, chills, SOB, CP, back pain. PE patient with Mild distress d/t pain. Pain and tenderness on RLQ with guard. CT scan showed Dilated appendix, up to 0.9 cm with periappendiceal inflammatory change; no abscess, phlegmon, or evidence of perforation  Patient with symptoms and complementary studies that suggest Non-complicated Acute Appendicitis    Plan:  - Admission for General Surgery  - NPO  - IVF  - Pain Management  - Unasyn 3 g Q6h  - Laparoscopy appendectomy, possible open.     Case discussed with Dr. Bustillos and Dr. Joyner 29 years old male c/o Abdominal pain for the last 2 days. States he has a progressive sharp abdominal pain, started in lower abdomen, and then the pain was more localized in the RLQ, no radiation, worsens with movement, relieved by nothing. Associated with nausea and vomiting. Denies fever, chills, SOB, CP, back pain. PE patient with Mild distress d/t pain. Pain and tenderness on RLQ with guard. CT scan showed Dilated appendix, up to 0.9 cm with periappendiceal inflammatory change; no abscess, phlegmon, or evidence of perforation  Patient with symptoms and complementary studies that suggest Non-complicated Acute Appendicitis    Plan:  - Admission for General Surgery  - NPO  - IVF  - Pain Management  - Unasyn 3 g Q6h  - Laparoscopy appendectomy, possible open.     Case discussed with Dr. Polanco and Dr. Joyner      Senior Resident Note  Pt seen and examined  Above note has been reviewed and edited  29M with lower abdominal pain for 2 days with some nausea. WBC is 8.6 but he is tender in the RLQ and CT showed simple appendicitis with no perforation. Plan for OR for lap appy   Plan d/w patient and Dr. Ar polanco

## 2020-07-20 NOTE — ED PROVIDER NOTE - ATTENDING CONTRIBUTION TO CARE
28 y/o male with h/o bipolar d/o, denies PSH, now presents with RLQ pain x 1-2 days, sx are constant (waxing / waning), denies radiation, denies modifying factors, +nbnb vomiting x 1, passing stool and flatus, denies fever, diarrhea, anorexia, cough, respiratory sx, change in bowel habits or urinary sx, penile d/c or other associated complaints at present.     Old chart reviewed.  I have reviewed and agree with the nursing note, except as documented in my note.    VSS, awake, alert, non-toxic appearing, lying comfortably in stretcher, in NAD, no scleral icterus, oropharynx clear, mmm, no jaundice, skin rash or lesions, chest CTAB, non-labored breathing, no w/r/r, +S1/S2, RRR, no m/r/g, abdomen soft, RLQ ttp w/o peritoneal signs, ND, +BS, no hernias or distention, no pulsatile masses or bruits appreciated, no CVA tenderness, no peripheral edema or deformities, alert, clear speech and steady gait.

## 2020-07-20 NOTE — H&P ADULT - NSHPPHYSICALEXAM_GEN_ALL_CORE
PHYSICAL EXAM:  GENERAL: Mild acute distress due to pain, well-appearing  CHEST/LUNG: Clear to auscultation bilaterally  HEART: Regular rate and rhythm  ABDOMEN: Soft, Nondistended; Pain on palpation in the RLQ, with tenderness and guard. BM+  EXTREMITIES:  No clubbing, cyanosis, or edema    Vital Signs Last 24 Hrs  T(C): 37.1 (20 Jul 2020 15:12), Max: 37.1 (20 Jul 2020 15:12)  T(F): 98.8 (20 Jul 2020 15:12), Max: 98.8 (20 Jul 2020 15:12)  HR: 94 (20 Jul 2020 15:12) (94 - 94)  BP: 164/88 (20 Jul 2020 15:12) (164/88 - 164/88)  RR: 18 (20 Jul 2020 15:12) (18 - 18)  SpO2: 95% (20 Jul 2020 15:12) (95% - 95%)

## 2020-07-20 NOTE — ED PROVIDER NOTE - OBJECTIVE STATEMENT
30yo male with PMHx chronic lower back pain presents c/o lower abdominal pain x 2 days with more localized pain to RLQ since last night described as pressure, without radiation, worse with movements and palpation, relieved by nothing. Pt was seen PTA by PMD who sent to ED r/o appy. Pt denies N/V/D, fever or chills. Denies urinary frequency or hematuria. Denies testicular pain or swelling

## 2020-07-20 NOTE — H&P ADULT - NSHPLABSRESULTS_GEN_ALL_CORE
LABS:                          15.9   8.64  )-----------( 152      ( 2020 15:56 )             46.2     07-20    141  |  105  |  13  ----------------------------<  174<H>  3.6   |  24  |  0.9    Ca    9.0      2020 15:56    TPro  6.4  /  Alb  4.4  /  TBili  0.2  /  DBili  <0.2  /  AST  18  /  ALT  26  /  AlkPhos  66  07-20    Lipase:27    Urinalysis Basic - ( 2020 17:30 )    Color: Yellow / Appearance: Clear / S.037 / pH: x  Gluc: x / Ketone: Trace  / Bili: Negative / Urobili: 3 mg/dL   Blood: x / Protein: Trace / Nitrite: Negative   Leuk Esterase: Small / RBC: 2 /HPF / WBC 37 /HPF   Sq Epi: x / Non Sq Epi: 2 /HPF / Bacteria:      CT scan  PERITONEUM/MESENTERY/BOWEL: Dilated appendix, up to 0.9 cm with periappendiceal inflammatory change; no abscess, phlegmon, or evidence of perforation (series 5, image 273). Sigmoid diverticulosis.

## 2020-07-20 NOTE — H&P ADULT - HISTORY OF PRESENT ILLNESS
BEBETO MEYERS [id number]  29y Male      HPI: 29 years old male c/o Abdominal pain for the last 2 days. States he has a progressive sharp abdominal pain, started in lower abdomen, and then the pain was more localized in the RLQ, no radiation, worsens with movement, relieved by nothing. Associated with nausea and vomiting. Denies fever, chills, SOB, CP, back pain.

## 2020-07-20 NOTE — ED PROVIDER NOTE - PHYSICAL EXAMINATION
CONST: Well appearing in NAD  EYES: PERRL, EOMI, Sclera and conjunctiva clear.   ENT: No nasal discharge.   CARD: Normal S1 S2; Normal rate and rhythm  RESP: Equal BS B/L, No wheezes, rhonchi or rales. No distress  GI: Soft, mild RLQ tenderness, mild R CVAT. No rebound or guarding  MS: Normal ROM in all extremities. No midline spinal tenderness.  SKIN: Warm, dry, no acute rashes. Good turgor  NEURO: A&Ox3, No focal deficits. Strength 5/5 with no sensory deficits.

## 2020-07-21 VITALS
DIASTOLIC BLOOD PRESSURE: 69 MMHG | HEART RATE: 82 BPM | RESPIRATION RATE: 13 BRPM | SYSTOLIC BLOOD PRESSURE: 149 MMHG | OXYGEN SATURATION: 97 %

## 2020-07-21 PROBLEM — Z00.00 ENCOUNTER FOR PREVENTIVE HEALTH EXAMINATION: Status: ACTIVE | Noted: 2020-07-21

## 2020-07-21 LAB — SARS-COV-2 RNA SPEC QL NAA+PROBE: SIGNIFICANT CHANGE UP

## 2020-07-21 PROCEDURE — 64488 TAP BLOCK BI INJECTION: CPT | Mod: XU,47

## 2020-07-21 PROCEDURE — 44970 LAPAROSCOPY APPENDECTOMY: CPT

## 2020-07-21 PROCEDURE — 88304 TISSUE EXAM BY PATHOLOGIST: CPT | Mod: 26

## 2020-07-21 RX ORDER — HYDROMORPHONE HYDROCHLORIDE 2 MG/ML
0.5 INJECTION INTRAMUSCULAR; INTRAVENOUS; SUBCUTANEOUS
Refills: 0 | Status: DISCONTINUED | OUTPATIENT
Start: 2020-07-21 | End: 2020-07-21

## 2020-07-21 RX ORDER — SODIUM CHLORIDE 9 MG/ML
1000 INJECTION, SOLUTION INTRAVENOUS
Refills: 0 | Status: DISCONTINUED | OUTPATIENT
Start: 2020-07-21 | End: 2020-07-21

## 2020-07-21 RX ORDER — OXYCODONE AND ACETAMINOPHEN 5; 325 MG/1; MG/1
1 TABLET ORAL
Qty: 8 | Refills: 0
Start: 2020-07-21

## 2020-07-21 RX ORDER — DIPHENHYDRAMINE HCL 50 MG
12.5 CAPSULE ORAL ONCE
Refills: 0 | Status: DISCONTINUED | OUTPATIENT
Start: 2020-07-21 | End: 2020-07-21

## 2020-07-21 RX ORDER — OXYCODONE 5 MG/1
5 TABLET ORAL
Qty: 10 | Refills: 0 | Status: ACTIVE | COMMUNITY
Start: 2020-07-21 | End: 1900-01-01

## 2020-07-21 RX ORDER — BUPIVACAINE 13.3 MG/ML
30 INJECTION, SUSPENSION, LIPOSOMAL INFILTRATION ONCE
Refills: 0 | Status: DISCONTINUED | OUTPATIENT
Start: 2020-07-21 | End: 2020-07-21

## 2020-07-21 RX ORDER — OXYCODONE HYDROCHLORIDE 5 MG/1
1 TABLET ORAL
Qty: 8 | Refills: 0
Start: 2020-07-21

## 2020-07-21 RX ORDER — ONDANSETRON 8 MG/1
4 TABLET, FILM COATED ORAL ONCE
Refills: 0 | Status: COMPLETED | OUTPATIENT
Start: 2020-07-21 | End: 2020-07-21

## 2020-07-21 RX ORDER — OXYCODONE AND ACETAMINOPHEN 5; 325 MG/1; MG/1
1 TABLET ORAL
Qty: 8 | Refills: 0
Start: 2020-07-21 | End: 2020-07-23

## 2020-07-21 RX ADMIN — ONDANSETRON 4 MILLIGRAM(S): 8 TABLET, FILM COATED ORAL at 04:19

## 2020-07-21 RX ADMIN — HYDROMORPHONE HYDROCHLORIDE 0.5 MILLIGRAM(S): 2 INJECTION INTRAMUSCULAR; INTRAVENOUS; SUBCUTANEOUS at 03:43

## 2020-07-21 RX ADMIN — HYDROMORPHONE HYDROCHLORIDE 0.5 MILLIGRAM(S): 2 INJECTION INTRAMUSCULAR; INTRAVENOUS; SUBCUTANEOUS at 03:53

## 2020-07-21 RX ADMIN — HYDROMORPHONE HYDROCHLORIDE 0.5 MILLIGRAM(S): 2 INJECTION INTRAMUSCULAR; INTRAVENOUS; SUBCUTANEOUS at 03:58

## 2020-07-21 RX ADMIN — HYDROMORPHONE HYDROCHLORIDE 0.5 MILLIGRAM(S): 2 INJECTION INTRAMUSCULAR; INTRAVENOUS; SUBCUTANEOUS at 03:13

## 2020-07-21 RX ADMIN — HYDROMORPHONE HYDROCHLORIDE 0.5 MILLIGRAM(S): 2 INJECTION INTRAMUSCULAR; INTRAVENOUS; SUBCUTANEOUS at 03:28

## 2020-07-21 RX ADMIN — Medication 600 MILLIGRAM(S): at 00:22

## 2020-07-21 RX ADMIN — Medication 650 MILLIGRAM(S): at 00:22

## 2020-07-21 NOTE — ASU DISCHARGE PLAN (ADULT/PEDIATRIC) - CALL YOUR DOCTOR IF YOU HAVE ANY OF THE FOLLOWING:
Fever greater than (need to indicate Fahrenheit or Celsius)/Nausea and vomiting that does not stop/Unable to urinate/Inability to tolerate liquids or foods/Wound/Surgical Site with redness, or foul smelling discharge or pus

## 2020-07-21 NOTE — ASU DISCHARGE PLAN (ADULT/PEDIATRIC) - CARE PROVIDER_API CALL
Barb Joyner  SURGERY  [address] [place] [address]  Phone: [phone]  Fax: [phone]  Follow Up Time: 2 weeks

## 2020-07-21 NOTE — BRIEF OPERATIVE NOTE - NSICDXBRIEFPROCEDURE_GEN_ALL_CORE_FT
PROCEDURES:  Laparoscopic appendectomy 21-Jul-2020 03:04:09  Don Evans, transversus abdominis plane, bilateral 21-Jul-2020 03:05:46  Don Evans

## 2020-07-22 LAB — SURGICAL PATHOLOGY STUDY: SIGNIFICANT CHANGE UP

## 2020-07-23 DIAGNOSIS — F17.210 NICOTINE DEPENDENCE, CIGARETTES, UNCOMPLICATED: ICD-10-CM

## 2020-07-23 DIAGNOSIS — K35.80 UNSPECIFIED ACUTE APPENDICITIS: ICD-10-CM

## 2020-07-23 DIAGNOSIS — F31.9 BIPOLAR DISORDER, UNSPECIFIED: ICD-10-CM

## 2020-08-07 ENCOUNTER — APPOINTMENT (OUTPATIENT)
Dept: SURGERY | Facility: CLINIC | Age: 30
End: 2020-08-07
Payer: MEDICAID

## 2020-08-07 VITALS
WEIGHT: 276 LBS | TEMPERATURE: 97.1 F | HEART RATE: 70 BPM | BODY MASS INDEX: 38.64 KG/M2 | HEIGHT: 71 IN | DIASTOLIC BLOOD PRESSURE: 70 MMHG | SYSTOLIC BLOOD PRESSURE: 124 MMHG

## 2020-08-07 DIAGNOSIS — K35.80 UNSPECIFIED ACUTE APPENDICITIS: ICD-10-CM

## 2020-08-07 DIAGNOSIS — R10.9 UNSPECIFIED ABDOMINAL PAIN: ICD-10-CM

## 2020-08-07 PROCEDURE — 99024 POSTOP FOLLOW-UP VISIT: CPT

## 2020-08-08 PROBLEM — K35.80 ACUTE APPENDICITIS: Status: ACTIVE | Noted: 2020-07-21

## 2020-08-10 ENCOUNTER — OUTPATIENT (OUTPATIENT)
Dept: OUTPATIENT SERVICES | Facility: HOSPITAL | Age: 30
LOS: 1 days | Discharge: HOME | End: 2020-08-10
Payer: MEDICAID

## 2020-08-10 DIAGNOSIS — R06.89 OTHER ABNORMALITIES OF BREATHING: ICD-10-CM

## 2020-08-10 PROCEDURE — 71046 X-RAY EXAM CHEST 2 VIEWS: CPT | Mod: 26

## 2020-08-11 ENCOUNTER — EMERGENCY (EMERGENCY)
Facility: HOSPITAL | Age: 30
LOS: 0 days | Discharge: HOME | End: 2020-08-11
Attending: EMERGENCY MEDICINE | Admitting: EMERGENCY MEDICINE
Payer: MEDICAID

## 2020-08-11 VITALS
OXYGEN SATURATION: 98 % | HEART RATE: 96 BPM | SYSTOLIC BLOOD PRESSURE: 148 MMHG | TEMPERATURE: 98 F | DIASTOLIC BLOOD PRESSURE: 79 MMHG | RESPIRATION RATE: 18 BRPM

## 2020-08-11 DIAGNOSIS — F17.200 NICOTINE DEPENDENCE, UNSPECIFIED, UNCOMPLICATED: ICD-10-CM

## 2020-08-11 DIAGNOSIS — R06.02 SHORTNESS OF BREATH: ICD-10-CM

## 2020-08-11 DIAGNOSIS — R07.89 OTHER CHEST PAIN: ICD-10-CM

## 2020-08-11 PROBLEM — R10.9 ABDOMINAL PAIN: Status: ACTIVE | Noted: 2020-08-11

## 2020-08-11 LAB
ANION GAP SERPL CALC-SCNC: 12 MMOL/L — SIGNIFICANT CHANGE UP (ref 7–14)
BUN SERPL-MCNC: 14 MG/DL — SIGNIFICANT CHANGE UP (ref 10–20)
CALCIUM SERPL-MCNC: 9.7 MG/DL — SIGNIFICANT CHANGE UP (ref 8.5–10.1)
CHLORIDE SERPL-SCNC: 106 MMOL/L — SIGNIFICANT CHANGE UP (ref 98–110)
CO2 SERPL-SCNC: 25 MMOL/L — SIGNIFICANT CHANGE UP (ref 17–32)
CREAT SERPL-MCNC: 0.9 MG/DL — SIGNIFICANT CHANGE UP (ref 0.7–1.5)
D DIMER BLD IA.RAPID-MCNC: 48 NG/ML DDU — SIGNIFICANT CHANGE UP (ref 0–230)
GLUCOSE SERPL-MCNC: 111 MG/DL — HIGH (ref 70–99)
HCT VFR BLD CALC: 48.8 % — SIGNIFICANT CHANGE UP (ref 42–52)
HGB BLD-MCNC: 16.4 G/DL — SIGNIFICANT CHANGE UP (ref 14–18)
MCHC RBC-ENTMCNC: 29.3 PG — SIGNIFICANT CHANGE UP (ref 27–31)
MCHC RBC-ENTMCNC: 33.6 G/DL — SIGNIFICANT CHANGE UP (ref 32–37)
MCV RBC AUTO: 87.1 FL — SIGNIFICANT CHANGE UP (ref 80–94)
NRBC # BLD: 0 /100 WBCS — SIGNIFICANT CHANGE UP (ref 0–0)
PLATELET # BLD AUTO: 175 K/UL — SIGNIFICANT CHANGE UP (ref 130–400)
POTASSIUM SERPL-MCNC: 4 MMOL/L — SIGNIFICANT CHANGE UP (ref 3.5–5)
POTASSIUM SERPL-SCNC: 4 MMOL/L — SIGNIFICANT CHANGE UP (ref 3.5–5)
RBC # BLD: 5.6 M/UL — SIGNIFICANT CHANGE UP (ref 4.7–6.1)
RBC # FLD: 12.5 % — SIGNIFICANT CHANGE UP (ref 11.5–14.5)
SODIUM SERPL-SCNC: 143 MMOL/L — SIGNIFICANT CHANGE UP (ref 135–146)
TROPONIN T SERPL-MCNC: <0.01 NG/ML — SIGNIFICANT CHANGE UP
WBC # BLD: 7.53 K/UL — SIGNIFICANT CHANGE UP (ref 4.8–10.8)
WBC # FLD AUTO: 7.53 K/UL — SIGNIFICANT CHANGE UP (ref 4.8–10.8)

## 2020-08-11 PROCEDURE — 71046 X-RAY EXAM CHEST 2 VIEWS: CPT | Mod: 26

## 2020-08-11 PROCEDURE — 99285 EMERGENCY DEPT VISIT HI MDM: CPT

## 2020-08-11 PROCEDURE — 71275 CT ANGIOGRAPHY CHEST: CPT | Mod: 26

## 2020-08-11 NOTE — ED PROVIDER NOTE - NS ED ROS FT
CONST: No fever, chills or bodyaches  EYES: No pain, redness, drainage or visual changes.  ENT: No ear pain or discharge, nasal discharge or congestion. No sore throat  CARD: (+) chest pain, No palpitations  RESP: (+) SOB, (+) cough, No hemoptysis. No hx of asthma or COPD  GI: No abdominal pain, N/V/D  MS: No joint pain, back pain or extremity pain/injury  SKIN: No rashes  NEURO: No headache, dizziness, paresthesias or LOC  : No dysuria

## 2020-08-11 NOTE — ED PROVIDER NOTE - PATIENT PORTAL LINK FT
You can access the FollowMyHealth Patient Portal offered by [facility] by registering at the following website: [email or web address]. By joining [employer]’s FollowMyHealth portal, you will also be able to view your health information using other applications (apps) compatible with our system.

## 2020-08-11 NOTE — PHYSICAL EXAM
[JVD] : no jugular venous distention  [Respiratory Effort] : normal respiratory effort [Purpura] : no purpura  [de-identified] : Normal [Alert] : alert [Calm] : calm [de-identified] : Normal [de-identified] : Normal [de-identified] : Normal

## 2020-08-11 NOTE — ED ADULT NURSE NOTE - OBJECTIVE STATEMENT
pt is 4 weeks s/p lap appy and is presenting with increased sob. pt says when he bends over it gets worse

## 2020-08-11 NOTE — HISTORY OF PRESENT ILLNESS
[de-identified] : Mannie is a 29 year old man who was in the ED with abdominal pain . \par He was worked up to have acute appendicitis. He underwent lap appendectomy. He was discharged from home. \par \par He is recovering well. \par Scars have healed well. \par 8/11: pt called. states is having a pain \par can not get back to work \par had gone to pmd\par

## 2020-08-11 NOTE — ED ADULT NURSE NOTE - NSIMPLEMENTINTERV_GEN_ALL_ED
Implemented All Universal Safety Interventions:  [place] to call system. Call bell, personal items and telephone within reach. Instruct patient to call for assistance. Room bathroom lighting operational. Non-slip footwear when patient is off stretcher. Physically safe environment: no spills, clutter or unnecessary equipment. Stretcher in lowest position, wheels locked, appropriate side rails in place.

## 2020-08-11 NOTE — ASSESSMENT
[FreeTextEntry1] : Mannie is a 29 year old man who was in the ED with abdominal pain . \par He was worked up to have acute appendicitis. He underwent lap appendectomy. He was discharged from home. \par \par He is recovering well. \par Scars have healed well. \par 8/11: pt called. states is having a pain \par can not get back to work \par had gone to pmd\par \par \par We explained in great detail the pathophysiology of the disease process. We nettie diagrams and discussed the workup for diagnosis and management.\par The Post operative care was explained to the patient. He was counselled on diet , exercise and wound care.\par We discussed the pathology and surgery with him.\par \par We will get blood work and CT\par \par if pain gets worse to go to the ED\par We discussed the importance of close follow up. \par We informed that he needs to follow up as needed. \par We also informed that he can call us if anything changes or has any questions.\par

## 2020-08-11 NOTE — PHYSICAL EXAM
[JVD] : no jugular venous distention  [Respiratory Effort] : normal respiratory effort [Purpura] : no purpura  [Calm] : calm [de-identified] : Normal [Alert] : alert [de-identified] : Normal [de-identified] : Normal [de-identified] : Normal

## 2020-08-11 NOTE — ED PROVIDER NOTE - OBJECTIVE STATEMENT
Pt is 21 days post OP lap AP. Pt developed SOB 3 days ago. Mild chest discomfort. Admits to dry cough but always has "smokers cough". Denies fever, chills, hemoptysis. Pt had a CXR done by PMD yesterday and was negative as per pt. Told to come to ED for eval

## 2020-08-11 NOTE — ED PROVIDER NOTE - PHYSICAL EXAMINATION
CONST: Well appearing in NAD  EYES: PERRL, EOMI, Sclera and conjunctiva clear.   ENT: No nasal discharge. TM's clear B/L without drainage. Oropharynx normal appearing, no erythema or exudates. Uvula midline.  NECK: Non-tender, no meningeal signs  CARD: Normal S1 S2; Normal rate and rhythm  RESP: Equal BS B/L, No wheezes, rhonchi or rales. No distress  GI: Soft, non-tender, non-distended.  MS: Normal ROM in all extremities. No midline spinal tenderness. Neg Homans or calf swelling  SKIN: Warm, dry, no acute rashes. Good turgor  NEURO: A&Ox3, No focal deficits. Strength 5/5 with no sensory deficits. Steady gait

## 2020-08-11 NOTE — ED PROVIDER NOTE - ATTENDING CONTRIBUTION TO CARE
30yo M, POD 21 s/p lap appy, presents for SOB for 3 days and chest discomfort. States had CXR done by PMD Dr. Quinn yesterday, negative, told to go to ED for eval to r/o PE. States has baseline cough from smoking, not worse than normal. Denies fever/chills, headache, dizziness, palpitations, nausea, vomiting, diarrhea, abd pain, leg swelling.     Vital signs reviewed  GENERAL: Patient nontoxic appearing, NAD  HEAD: NCAT  EYES: Anicteric  ENT: MMM  RESPIRATORY: Normal respiratory effort. CTA B/L. No wheezing, rales, rhonchi  CARDIOVASCULAR: Regular rate and rhythm  ABDOMEN: Soft. Nondistended. Nontender. No guarding or rebound.   MUSCULOSKELETAL/EXTREMITIES: Brisk cap refill. Equal radial pulses. No leg edema or calf tenderness  SKIN:  Surgical wounds well healing, no erythema, discharge.   NEURO: AAOx3. No gross FND.

## 2020-08-11 NOTE — ED PROVIDER NOTE - CLINICAL SUMMARY MEDICAL DECISION MAKING FREE TEXT BOX
30yo M, POD 21 s/p lap appy, presents for SOB for 3 days, sent to ED to r/o PE. Chest CT negative for PE. PT made aware of pulm nodules an given smoking history needs f/u for them. Labs WNL. vitals WNL. Stable at time of discharge. F/u with surgeon as scheduled.

## 2020-08-18 ENCOUNTER — APPOINTMENT (OUTPATIENT)
Dept: SURGERY | Facility: CLINIC | Age: 30
End: 2020-08-18

## 2020-08-18 ENCOUNTER — APPOINTMENT (OUTPATIENT)
Dept: SURGERY | Facility: CLINIC | Age: 30
End: 2020-08-18
Payer: MEDICAID

## 2020-08-18 VITALS
SYSTOLIC BLOOD PRESSURE: 130 MMHG | DIASTOLIC BLOOD PRESSURE: 70 MMHG | HEART RATE: 99 BPM | BODY MASS INDEX: 39.06 KG/M2 | TEMPERATURE: 97.6 F | WEIGHT: 279 LBS | HEIGHT: 71 IN

## 2020-08-18 PROCEDURE — 99024 POSTOP FOLLOW-UP VISIT: CPT

## 2020-09-23 ENCOUNTER — APPOINTMENT (OUTPATIENT)
Dept: SURGERY | Facility: CLINIC | Age: 30
End: 2020-09-23

## 2020-10-01 NOTE — ED ADULT NURSE NOTE - CAS EDP DISCH TYPE
Continue your omeprazole.  Take your Zofran if needed for nausea.  Consume a bland, low-fat diet.  Monitor which foods may cause worsening pain.  Your labs are normal.  Your ultrasound revealed a possible gallbladder polyp.  Unclear if this may be related to your symptoms.  Follow-up with your primary care physician for reevaluation.  Seek medical attention immediately for any significant worsening of your condition overall.   Home

## 2020-12-30 NOTE — ED ADULT TRIAGE NOTE - BP NONINVASIVE DIASTOLIC (MM HG)
Render Note In Bullet Format When Appropriate: No Post-Care Instructions: I reviewed with the patient in detail post-care instructions. Patient is to wear sunprotection, and avoid picking at any of the treated lesions. Pt may apply Vaseline to crusted or scabbing areas. Number Of Freeze-Thaw Cycles: 3 freeze-thaw cycles Consent: The patient's consent was obtained including but not limited to risks of crusting, scabbing, blistering, scarring, darker or lighter pigmentary change, recurrence, incomplete removal and infection. Duration Of Freeze Thaw-Cycle (Seconds): 3 88 Detail Level: Detailed

## 2021-04-12 ENCOUNTER — OUTPATIENT (OUTPATIENT)
Dept: OUTPATIENT SERVICES | Facility: HOSPITAL | Age: 31
LOS: 1 days | Discharge: HOME | End: 2021-04-12

## 2021-04-14 ENCOUNTER — OUTPATIENT (OUTPATIENT)
Dept: OUTPATIENT SERVICES | Facility: HOSPITAL | Age: 31
LOS: 1 days | Discharge: HOME | End: 2021-04-14

## 2022-03-02 NOTE — PROGRESS NOTE BEHAVIORAL HEALTH - NS ED BHA MSE SPEECH VOLUME
Admitted this patient from ED via cart. Patient is alert and oriented x4, not in any acute distress. Upon auscultation, lung sounds are clear, abdomen soft non distended, peripheral pulses are palpable. Oriented patient to room, fall and safety precautions in place, call light placed within easy reach. Will continue to monitor patient.  
Normal

## 2022-04-06 NOTE — ED PROVIDER NOTE - CONDITION AT DISCHARGE:
Medications reviewed and updated.  Allergies verified.   Tobacco verified.     Pt is diabetic: no  Pt is insulin dependent: no  Pt is on blood thinners: ASA       Satisfactory

## 2024-02-14 NOTE — HISTORY OF PRESENT ILLNESS
[de-identified] : Mannie is a 29 year old man who was in the ED with abdominal pain . \par He was worked up to have acute appendicitis. He underwent lap appendectomy. He was discharged from home. \par \par He is recovering well. \par Scars have healed well. \par 8/11: pt called. states is having a pain \par can not get back to work \par had gone to pmd\par  Respiratory
